# Patient Record
Sex: MALE | Race: BLACK OR AFRICAN AMERICAN | NOT HISPANIC OR LATINO | Employment: FULL TIME | URBAN - METROPOLITAN AREA
[De-identification: names, ages, dates, MRNs, and addresses within clinical notes are randomized per-mention and may not be internally consistent; named-entity substitution may affect disease eponyms.]

---

## 2021-06-24 DIAGNOSIS — Z11.1 SCREENING EXAMINATION FOR PULMONARY TUBERCULOSIS: Primary | ICD-10-CM

## 2021-06-24 PROCEDURE — 36415 COLL VENOUS BLD VENIPUNCTURE: CPT | Performed by: FAMILY MEDICINE

## 2021-06-24 PROCEDURE — 86481 TB AG RESPONSE T-CELL SUSP: CPT | Performed by: FAMILY MEDICINE

## 2021-06-25 LAB
GAMMA INTERFERON BACKGROUND BLD IA-ACNC: 0.02 IU/ML
M TB IFN-G CD4+ BCKGRND COR BLD-ACNC: 9.98 IU/ML
M TB TUBERC IFN-G BLD QL: NEGATIVE
MITOGEN IGNF BCKGRD COR BLD-ACNC: 0.01 IU/ML
MITOGEN IGNF BCKGRD COR BLD-ACNC: 0.02 IU/ML

## 2023-01-25 ENCOUNTER — HOSPITAL ENCOUNTER (EMERGENCY)
Facility: CLINIC | Age: 28
Discharge: HOME | End: 2023-01-25
Attending: PHYSICIAN ASSISTANT | Admitting: PHYSICIAN ASSISTANT
Payer: OTHER MISCELLANEOUS

## 2023-01-25 VITALS
HEIGHT: 69 IN | SYSTOLIC BLOOD PRESSURE: 157 MMHG | DIASTOLIC BLOOD PRESSURE: 86 MMHG | HEART RATE: 81 BPM | OXYGEN SATURATION: 100 % | RESPIRATION RATE: 20 BRPM | BODY MASS INDEX: 43.4 KG/M2 | WEIGHT: 293 LBS | TEMPERATURE: 98.9 F

## 2023-01-25 DIAGNOSIS — S33.5XXA LUMBAR SPRAIN: ICD-10-CM

## 2023-01-25 PROCEDURE — 99284 EMERGENCY DEPT VISIT MOD MDM: CPT | Performed by: PHYSICIAN ASSISTANT

## 2023-01-25 PROCEDURE — 99283 EMERGENCY DEPT VISIT LOW MDM: CPT | Performed by: PHYSICIAN ASSISTANT

## 2023-01-25 RX ORDER — CYCLOBENZAPRINE HCL 10 MG
5-10 TABLET ORAL 3 TIMES DAILY PRN
Qty: 12 TABLET | Refills: 0 | Status: SHIPPED | OUTPATIENT
Start: 2023-01-25 | End: 2023-01-31

## 2023-01-25 RX ORDER — MELOXICAM 15 MG/1
15 TABLET ORAL DAILY
Qty: 15 TABLET | Refills: 0 | Status: SHIPPED | OUTPATIENT
Start: 2023-01-25 | End: 2023-02-08

## 2023-01-25 NOTE — LETTER
January 25, 2023      To Whom It May Concern:      Kathia Evans was seen in our Emergency Department today, 01/25/23.  I expect his condition to improve over the next 1-2 weeks.  He may return to work on 1/26/2023 with the following restrictions:    *  No bending or twisting with his low back.  *  No lifting over 5 pounds.    Duration of limitations - 1 week.      Sincerely,            Shay Ramon PA-C

## 2023-01-25 NOTE — ED PROVIDER NOTES
"  History     Chief Complaint   Patient presents with     Back Pain     HPI  Kathia Evans is a 27 year old male who presents for evaluation of low back pain starting last evening.  He was at work when this started.  He does a lot of bending, lifting, and twisting at work.  He works as a nursing assistant.  Reports that he started to have achiness in his back.  Has worsened throughout the day today.  Worse with any movement or standing.  Better if he is sitting still.  With movement his pain is rated 8-9 on a scale of 10 and described as dull and achy.  4 on a scale of 10 when he is resting.  He has attempted treatment with Biofreeze and stretching.  He works 12-16-hour shifts.  Denies any lower extremity numbness/tingling.  No weakness.  No saddle anesthesia.  No lower extremity weakness.  No fevers or chills.  Denies ever using IV street drugs.  No recent weight loss.  No prior history of low back problems.  No recent fall, MVA, or other injury.                Allergies:  No Known Allergies    Problem List:    There are no problems to display for this patient.       Past Medical History:    No past medical history on file.    Past Surgical History:    No past surgical history on file.    Family History:    No family history on file.    Social History:  Marital Status:  Single [1]        Medications:    No current outpatient medications on file.        Review of Systems   All other systems reviewed and are negative.      Physical Exam   Pulse: 98  Temp: 98.9  F (37.2  C)  Resp: 20  Height: 175.3 cm (5' 9\")  Weight: 132.9 kg (293 lb)  SpO2: 99 %      Physical Exam  Vitals and nursing note reviewed.   Constitutional:       General: He is not in acute distress.     Appearance: He is not diaphoretic.   HENT:      Head: Normocephalic and atraumatic.      Right Ear: External ear normal.      Left Ear: External ear normal.      Nose: Nose normal.      Mouth/Throat:      Pharynx: No oropharyngeal exudate.   Eyes:      " General: No scleral icterus.        Right eye: No discharge.         Left eye: No discharge.      Conjunctiva/sclera: Conjunctivae normal.      Pupils: Pupils are equal, round, and reactive to light.   Neck:      Thyroid: No thyromegaly.   Cardiovascular:      Rate and Rhythm: Normal rate and regular rhythm.      Heart sounds: Normal heart sounds. No murmur heard.  Pulmonary:      Effort: Pulmonary effort is normal. No respiratory distress.      Breath sounds: Normal breath sounds. No wheezing or rales.   Chest:      Chest wall: No tenderness.   Abdominal:      General: Bowel sounds are normal. There is no distension.      Palpations: Abdomen is soft. There is no mass.      Tenderness: There is no abdominal tenderness. There is no guarding or rebound.   Musculoskeletal:         General: No deformity. Normal range of motion.      Cervical back: Normal range of motion and neck supple.      Comments: Lumbosacral spine area reveals no mass but there is tenderness of the paravertebral muscles. Full but painful lumbosacral range of motion is noted. Straight leg raise is negative at 60 degrees on both sides. DTR's, motor strength and sensation normal, including heel and toe gait.  Peripheral pulses are palpable. Hips and knees have full range of motion without pain.    Lymphadenopathy:      Cervical: No cervical adenopathy.   Skin:     General: Skin is warm and dry.      Capillary Refill: Capillary refill takes less than 2 seconds.      Findings: No erythema or rash.   Neurological:      General: No focal deficit present.      Mental Status: He is alert and oriented to person, place, and time.      Cranial Nerves: No cranial nerve deficit.      Motor: No weakness.      Coordination: Coordination normal.      Deep Tendon Reflexes: Reflexes normal.   Psychiatric:         Behavior: Behavior normal.         Thought Content: Thought content normal.         ED Course                 Procedures              Critical Care time:   none               No results found for this or any previous visit (from the past 24 hour(s)).    Medications - No data to display    Assessments & Plan (with Medical Decision Making)  Lumbar sprain     27 year old male presents for evaluation of low back pain.  No history of fall, MVA, or trauma.  No red flag symptoms.  He works as a nursing assistant, and does a lot of heavy lifting.  Symptoms started with lifting at work.  Taking Tylenol and ibuprofen with limited improvement.  On exam blood pressure 157/86, temperature 98.9, pulse 81, respiration 20, oxygen saturation 100% on room air.  Patient appears to be in no acute distress.  He is slow with his movement.  Has significant tenderness of his paravertebral musculature of the lumbar spine.  No significant midline tenderness.  Lower extremities with equal and appropriate range of motion, strength, and sensation.  DTRs 2 out of 4 without clonus.  Distal pulses 2+.  Abdomen soft and nontender.  No other exam abnormalities.  Reassured the patient.  No history to suggest fracture or other acute emergent abnormality.  Therefore, plain film imaging not indicated.  No radicular symptoms.  This is consistent with a lumbar sprain.  Rest recommended.  Work restrictions provided.  No bending or twisting.  No lifting over 5 pounds.  Duration of limitations is 1 week.  Start meloxicam daily for the inflammation.  Tylenol 1000 mg every 6 hours as needed on top of that for pain.  Cyclobenzaprine 5-10 mg every 8 hours as needed for muscle spasm.  Avoid driving for 8 hours after taking cyclobenzaprine given the sedation side effect.  Follow-up with sports medicine in a week for recheck to follow his progress.  This likely will improve with time.  Could potentially be a candidate for formal physical therapy assessment.  Indications for ED return reviewed with the patient.  He was in agreement with this plan and was suitable for discharge.     I have reviewed the nursing  notes.    I have reviewed the findings, diagnosis, plan and need for follow up with the patient.       Medical Decision Making  The patient's presentation is strongly suggestive of a clearly self-limited or minor problem.    The patient's evaluation involved:  history and exam without other MDM data elements    The patient's management involved prescription drug management (including medications given in the ED).        New Prescriptions    No medications on file       Final diagnoses:   None     Disclaimer: This note consists of symbols derived from keyboarding, dictation and/or voice recognition software. As a result, there may be errors in the script that have gone undetected. Please consider this when interpreting information found in this chart.      As scribed by Kelly VIRAMONTES.  Note reviewed and addended by Shay Ramon PA-C.  Patient was examined and patient counseling was performed by Shay Ramon PA-C.       1/25/2023   [facility] EMERGENCY DEPT     Shay Ramon PA-C  01/25/23 [id number]

## 2023-01-25 NOTE — DISCHARGE INSTRUCTIONS
It was a pleasure working with you today!  I hope your condition improves rapidly!     As we discussed, you have a strain of your low back muscles and ligaments.  Thankfully, your exam did not show anything more worrisome.  Please REST!!  Do not bend, lift, or twist at this point.  Start the meloxicam daily for inflammation.  You can still take Tylenol 1000 mg every 6 hours as needed for pain on top of the meloxicam.  Use the cyclobenzaprine as needed for muscle spasm.  Do not drive for 8 hours after taking cyclobenzaprine, as this can impair your judgment.  Try heat over the low back muscles for 20 minutes every couple hours.  Try some gentle stretches.  The orthopedic department should be contacting you tomorrow to line up a 1 week recheck appointment.  Please follow your work restrictions at home as well.

## 2023-01-25 NOTE — ED TRIAGE NOTES
Pt presents with concerns of lower back pain.  Pt states that he worked last night and felt increase in back pain. Pt states that he works nights and has not slept today which has not helped the pain.  Pt states difficult to stand from sitting and to get dressed.  Pt drove self to the ED, but can get a ride if needed.  Ibuprofen at 0745 and again at 1145.     Triage Assessment     Row Name 01/25/23 1379       Triage Assessment (Adult)    Airway WDL WDL       Respiratory WDL    Respiratory WDL WDL       Skin Circulation/Temperature WDL    Skin Circulation/Temperature WDL WDL       Cardiac WDL    Cardiac WDL WDL       Peripheral/Neurovascular WDL    Peripheral Neurovascular WDL WDL       Cognitive/Neuro/Behavioral WDL    Cognitive/Neuro/Behavioral WDL WDL

## 2023-02-02 ENCOUNTER — OFFICE VISIT (OUTPATIENT)
Dept: NEUROSURGERY | Facility: CLINIC | Age: 28
End: 2023-02-02
Attending: PHYSICIAN ASSISTANT
Payer: OTHER MISCELLANEOUS

## 2023-02-02 ENCOUNTER — TELEPHONE (OUTPATIENT)
Dept: NEUROSURGERY | Facility: CLINIC | Age: 28
End: 2023-02-02

## 2023-02-02 VITALS
HEIGHT: 69 IN | BODY MASS INDEX: 42.95 KG/M2 | DIASTOLIC BLOOD PRESSURE: 98 MMHG | HEART RATE: 93 BPM | SYSTOLIC BLOOD PRESSURE: 158 MMHG | OXYGEN SATURATION: 99 % | WEIGHT: 290 LBS

## 2023-02-02 DIAGNOSIS — S33.5XXA LUMBAR SPRAIN, INITIAL ENCOUNTER: ICD-10-CM

## 2023-02-02 PROCEDURE — 99244 OFF/OP CNSLTJ NEW/EST MOD 40: CPT | Performed by: NURSE PRACTITIONER

## 2023-02-02 ASSESSMENT — PAIN SCALES - GENERAL: PAINLEVEL: MILD PAIN (2)

## 2023-02-02 NOTE — PATIENT INSTRUCTIONS
Work letter provided today.     Please call our clinic if symptoms persist, change, or worsen at any time.     [facility] Neurosurgery  [address] [address]  Tel [phone]

## 2023-02-02 NOTE — LETTER
[facility]  Neurosurgery Clinic  [address] [place] [address]  [id number]      2/2/23     To Whom it May Concern,      Kathia Evans is being seen at our clinic for evaluation of lumbar sprain. Kathia reports low back pain has resolved. He is able to return to work as tolerated. Please call our clinic with questions or concerns: [phone]      Sincerely,        Xin Villanueva CNP  [facility] Neurosurgery  [address] [address]  Tel [phone]

## 2023-02-02 NOTE — TELEPHONE ENCOUNTER
Patient's employer called her name is Ashwin Wick. She wanted to know if patient is allowed to come back to work with no restrictions. She received a work letter from Xin Villanueva and wanted clarifications on it and wanted to know if it means the patient is allowed back at work with no restrictions. Without that statement the patient will not be able return back to work that is why Ashwin is verifying what the work letter specifically says. Please call Ashwin back at [phone] to clarify. She said a detailed voice mail is also fine if you are unable to reach her. Thank you ~

## 2023-02-02 NOTE — PROGRESS NOTES
"Kathia Evans is a 27 year old male who presents for:  Chief Complaint   Patient presents with     Back Pain     Low back pain         Initial Vitals:  BP (!) 158/98   Pulse 93   Ht 5' 9\" (1.753 m)   Wt 290 lb (131.5 kg)   SpO2 99%   BMI 42.83 kg/m   Estimated body mass index is 42.83 kg/m  as calculated from the following:    Height as of this encounter: 5' 9\" (1.753 m).    Weight as of this encounter: 290 lb (131.5 kg).. Body surface area is 2.53 meters squared. BP completed using cuff size: regular  Mild Pain (2)    Nursing Comments:     Mamie Dumont MA    "

## 2023-02-02 NOTE — LETTER
[facility]  Neurosurgery Clinic  [address] [place] [address]  [id number]        02/06/23    To Whom it May Concern,      Kathia Evans is being seen at our clinic for evaluation of lumbar sprain. Kathia reports low back pain has resolved. He is able to return to work and advance activity as tolerated. Please call our clinic with questions or concerns: [phone]      Sincerely,    Xin Villanueva CNP  (Electronically Signed 02/06/23 11:34 AM)

## 2023-02-02 NOTE — LETTER
"    2/2/2023         RE: Kathia Evans  [address] [facility] [address]        Dear Colleague,    Thank you for referring your patient, Kathia Evans, to the [facility]. Please see a copy of my visit note below.    Dr. Dinesh Billings   [facility] Neurosurgery Clinic Visit      CC: lumbar sprain     Primary Care Provider: Clinic - [facility]    Reason For Visit:   I was asked by Shay Ramon PA-C to consult on the patient for: lumbar sprain       HPI: Kathia Evans is a 27 year old male who presents for evaluation of acute midline low back pain/muscle spasms. 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 Today, patient reports feeling well, low back pain has resolved with rest. Denies any leg pain, numbness, weakness, falls, foot drop, saddle anesthesia, or bladder/bowel incontinence. Patient would like to return to work.     No past medical history on file.    No past surgical history on file.    Current Outpatient Medications   Medication     meloxicam (MOBIC) 15 MG tablet     No current facility-administered medications for this visit.       No Known Allergies    Social History     Socioeconomic History     Marital status: Single     Spouse name: None     Number of children: None     Years of education: None     Highest education level: None   Tobacco Use     Smoking status: Never     Smokeless tobacco: Never       No family history on file.    ROS: 10 point ROS neg other than the symptoms noted above in the HPI.    Vital Signs: BP (!) 158/98   Pulse 93   Ht 1.753 m (5' 9\")   Wt 131.5 kg (290 lb)   SpO2 99%   BMI 42.83 kg/m      Physical Examination:  Constitutional:  Alert, well nourished, NAD.  HEENT: Normocephalic, atraumatic.   Pulmonary:  Without shortness of breath, normal effort.   Cardiovascular:  No pitting edema of BLE. " "     Neurological:  Awake  Alert  Oriented x 3  Speech clear    Motor exam:  Iliopsoas  (hip flexion)               Right: 5/5  Left:  5/5  Quadriceps  (knee extension)       Right:  5/5  Left:  5/5  Hamstrings  (knee flexion)            Right:  5/5  Left:  5/5  Gastroc Soleus  (PF)                          Right:  5/5  Left:  5/5  Tibialis Ant  (DF)                          Right:  5/5  Left:  5/5  EHL                          Right:  5/5  Left:  5/5         Sensation normal to BLE     Reflexes are 2+ in the patellar and Achilles. There is no clonus.     Musculoskeletal:  Gait: Able to stand from a seated position. Normal non-antalgic, non-myelopathic gait.   No tenderness of the spine or paraspinous muscles.  Hip height is symmetrical.  Negative SI joint tenderness bilaterally.  Negative straight leg raise bilaterally.      Assessment/Plan:   27 year old male who presents for evaluation of acute lumbar sprain. Today, patient reports feeling well, low back pain has resolved with rest. Denies any leg pain, numbness, weakness, falls, foot drop, saddle anesthesia, or bladder/bowel incontinence. We discussed obtaining a lumbar spine xray and referral to PT, but patient declined since he is feeling well. Patient would like to return to work at this time. Advised patient to follow-up with our clinic for any new or worsening symptoms.     Advised patient to call our clinic with any questions or concerns. Discussed red flag symptoms and advised to seek medical attention if these develop. Patient voiced understanding and agreement.      Xin Villanueva, [facility] [place] [address]  Tel [phone]  Pager [phone]        Kathia Evans is a 27 year old male who presents for:  Chief Complaint   Patient presents with     Back Pain     Low back pain         Initial Vitals:  BP (!) 158/98   Pulse 93   Ht 5' 9\" (1.753 m)   Wt 290 lb " "(131.5 kg)   SpO2 99%   BMI 42.83 kg/m   Estimated body mass index is 42.83 kg/m  as calculated from the following:    Height as of this encounter: 5' 9\" (1.753 m).    Weight as of this encounter: 290 lb (131.5 kg).. Body surface area is 2.53 meters squared. BP completed using cuff size: regular  Mild Pain (2)    Nursing Comments:     Mamie Dumont MA        Again, thank you for allowing me to participate in the care of your patient.        Sincerely,        Xin Villanueva NP    "

## 2023-02-02 NOTE — PROGRESS NOTES
"Dr. Dinesh CHAVEZ [facility] Neurosurgery Clinic Visit      CC: lumbar sprain     Primary Care Provider: Clinic - [facility]    Reason For Visit:   I was asked by Shay Ramon PA-C to consult on the patient for: lumbar sprain       HPI: Kathia Evans is a 27 year old male who presents for evaluation of acute midline low back pain/muscle spasms. 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 Today, patient reports feeling well, low back pain has resolved with rest. Denies any leg pain, numbness, weakness, falls, foot drop, saddle anesthesia, or bladder/bowel incontinence. Patient would like to return to work.     No past medical history on file.    No past surgical history on file.    Current Outpatient Medications   Medication     meloxicam (MOBIC) 15 MG tablet     No current facility-administered medications for this visit.       No Known Allergies    Social History     Socioeconomic History     Marital status: Single     Spouse name: None     Number of children: None     Years of education: None     Highest education level: None   Tobacco Use     Smoking status: Never     Smokeless tobacco: Never       No family history on file.    ROS: 10 point ROS neg other than the symptoms noted above in the HPI.    Vital Signs: BP (!) 158/98   Pulse 93   Ht 1.753 m (5' 9\")   Wt 131.5 kg (290 lb)   SpO2 99%   BMI 42.83 kg/m      Physical Examination:  Constitutional:  Alert, well nourished, NAD.  HEENT: Normocephalic, atraumatic.   Pulmonary:  Without shortness of breath, normal effort.   Cardiovascular:  No pitting edema of BLE.      Neurological:  Awake  Alert  Oriented x 3  Speech clear    Motor exam:  Iliopsoas  (hip flexion)               Right: 5/5  Left:  5/5  Quadriceps  (knee extension)       Right:  5/5  Left:  5/5  Hamstrings  (knee flexion)            Right:  5/5  " Left:  5/5  Gastroc Soleus  (PF)                          Right:  5/5  Left:  5/5  Tibialis Ant  (DF)                          Right:  5/5  Left:  5/5  EHL                          Right:  5/5  Left:  5/5         Sensation normal to BLE     Reflexes are 2+ in the patellar and Achilles. There is no clonus.     Musculoskeletal:  Gait: Able to stand from a seated position. Normal non-antalgic, non-myelopathic gait.   No tenderness of the spine or paraspinous muscles.  Hip height is symmetrical.  Negative SI joint tenderness bilaterally.  Negative straight leg raise bilaterally.      Assessment/Plan:   27 year old male who presents for evaluation of acute lumbar sprain. Today, patient reports feeling well, low back pain has resolved with rest. Denies any leg pain, numbness, weakness, falls, foot drop, saddle anesthesia, or bladder/bowel incontinence. We discussed obtaining a lumbar spine xray and referral to PT, but patient declined since he is feeling well. Patient would like to return to work at this time. Advised patient to follow-up with our clinic for any new or worsening symptoms.     Advised patient to call our clinic with any questions or concerns. Discussed red flag symptoms and advised to seek medical attention if these develop. Patient voiced understanding and agreement.      Xin Villanueva CNP  [facility] Neurosurgery  [address] [address]  Tel [phone]  Pager [phone]

## 2023-02-03 NOTE — TELEPHONE ENCOUNTER
Spoke to Syeda Sanchez (patient's employer) She is wanting to clarify the current work letter patient was given from clinic visit yesterday. Says he can RTW as tolerated but needs to know if that means with or without any restrictions. Message routed to Xin Villanueva CNP for review.     Syeda said we can fax an updated letter to her attention at [phone] or a phone call is fine too she said.

## 2023-02-06 NOTE — TELEPHONE ENCOUNTER
Per Xin Villanueva, CNP Okay to RTW, advance activity as tolerated. Per Xin, patient said back pain resolved at the visit on 2/2/23.    Updated work letter.    Faxed updated RTW letter to ATTN: Syeda Loomis  February 6, 2023 to fax number [phone].    Right Fax confirmed at 1135 AM    Sent patient [employer] message that letter was updated and faxed to employer.

## 2023-02-06 NOTE — TELEPHONE ENCOUNTER
Patient called to state they need their work letter updated to show they can work without restrictions.  Please call patient to verify this information [phone].

## 2023-02-11 ENCOUNTER — HEALTH MAINTENANCE LETTER (OUTPATIENT)
Age: 28
End: 2023-02-11

## 2024-03-09 ENCOUNTER — HEALTH MAINTENANCE LETTER (OUTPATIENT)
Age: 29
End: 2024-03-09

## 2024-07-31 ENCOUNTER — NURSE TRIAGE (OUTPATIENT)
Dept: FAMILY MEDICINE | Facility: CLINIC | Age: 29
End: 2024-07-31

## 2024-07-31 NOTE — TELEPHONE ENCOUNTER
Nurse Triage SBAR    Is this a 2nd Level Triage? YES, LICENSED PRACTITIONER REVIEW IS REQUIRED    Situation: Patient calling to report he has had a cough for over a week that is worsening. He states he feels like it is stuck in his chest. He is coughing up phlegm that is brown in color. He reports SOB with activity and increased pulse rate. No fevers but he reports he has not checked it either.    Background: 7+ days of cough and SOB with activity    Assessment: Patient is having a continuous cough and SOB with activity. He needs to be evaluated today due to increased pulse and some wheezing that just started. Patient agrees and will go to .    Protocol Recommended Disposition:   Go To Office Now    Recommendation: Patient agrees to go to  now.         Does the patient meet one of the following criteria for ADS visit consideration? No    Patient will go to  nearest his home.    ANIBAL Garcia, RN      Reason for Disposition   MILD difficulty breathing (e.g., minimal/no SOB at rest, SOB with walking, pulse <100) and still present when not coughing    Additional Information   Negative: Bluish (or gray) lips or face   Negative: SEVERE difficulty breathing (e.g., struggling for each breath, speaks in single words)   Negative: Rapid onset of cough and has hives   Negative: Coughing started suddenly after medicine, an allergic food or bee sting   Negative: Difficulty breathing after exposure to flames, smoke, or fumes   Negative: Sounds like a life-threatening emergency to the triager   Negative: Previous asthma attacks and this feels like asthma attack   Negative: Dry cough (non-productive; no sputum or minimal clear sputum) and within 14 days of COVID-19 Exposure   Negative: MODERATE difficulty breathing (e.g., speaks in phrases, SOB even at rest, pulse 100-120) and still present when not coughing   Negative: Chest pain present when not coughing   Negative: Passed out (i.e., fainted, collapsed and was not  responding)   Negative: Patient sounds very sick or weak to the triager    Protocols used: Cough-A-OH

## 2025-03-16 ENCOUNTER — HEALTH MAINTENANCE LETTER (OUTPATIENT)
Age: 30
End: 2025-03-16